# Patient Record
Sex: MALE | Race: WHITE | Employment: OTHER | URBAN - METROPOLITAN AREA
[De-identification: names, ages, dates, MRNs, and addresses within clinical notes are randomized per-mention and may not be internally consistent; named-entity substitution may affect disease eponyms.]

---

## 2022-03-19 PROBLEM — G60.8 MIXED SENSORY-MOTOR POLYNEUROPATHY: Status: ACTIVE | Noted: 2021-05-11

## 2022-06-27 ENCOUNTER — OFFICE VISIT (OUTPATIENT)
Dept: NEUROLOGY | Age: 77
End: 2022-06-27
Payer: MEDICARE

## 2022-06-27 VITALS
DIASTOLIC BLOOD PRESSURE: 88 MMHG | BODY MASS INDEX: 24.91 KG/M2 | WEIGHT: 194 LBS | HEART RATE: 63 BPM | SYSTOLIC BLOOD PRESSURE: 149 MMHG

## 2022-06-27 DIAGNOSIS — R20.0 NUMBNESS OF FEET: Primary | ICD-10-CM

## 2022-06-27 DIAGNOSIS — G60.8 MIXED SENSORY-MOTOR POLYNEUROPATHY: ICD-10-CM

## 2022-06-27 PROCEDURE — G8428 CUR MEDS NOT DOCUMENT: HCPCS | Performed by: PSYCHIATRY & NEUROLOGY

## 2022-06-27 PROCEDURE — 1123F ACP DISCUSS/DSCN MKR DOCD: CPT | Performed by: PSYCHIATRY & NEUROLOGY

## 2022-06-27 PROCEDURE — 99215 OFFICE O/P EST HI 40 MIN: CPT | Performed by: PSYCHIATRY & NEUROLOGY

## 2022-06-27 PROCEDURE — 4004F PT TOBACCO SCREEN RCVD TLK: CPT | Performed by: PSYCHIATRY & NEUROLOGY

## 2022-06-27 PROCEDURE — G8420 CALC BMI NORM PARAMETERS: HCPCS | Performed by: PSYCHIATRY & NEUROLOGY

## 2022-06-27 PROCEDURE — 95910 NRV CNDJ TEST 7-8 STUDIES: CPT | Performed by: PSYCHIATRY & NEUROLOGY

## 2022-06-27 ASSESSMENT — VISUAL ACUITY: VA_NORMAL: 1

## 2022-06-27 NOTE — PROGRESS NOTES
6/27/2022  [place] 65 y.o. male      Chief Complaint:  No chief complaint on file. Followup Note:   No falling, numbness in feet, noticed not as good in balance, hiking and fishing some areas not comfortable to go through. No meds needed. Intense itching sensation in feet, relieved by bending stretching by fingers touch to toes. No LBP. No alcohol abuse, no diabetes. Previous clear cut history of agent orange exposure. No family history of neuropathy. Review Test Results: I have reviewed imaging study and lab tests, discussed results with patient in detail. Current Outpatient Medications   Medication Sig Dispense Refill    NIACIN PO Take by mouth      allopurinol (ZYLOPRIM) 300 MG tablet Take by mouth daily      aspirin 81 MG EC tablet Take by mouth daily      cyanocobalamin 500 MCG tablet Take 500 mcg by mouth daily      dicyclomine (BENTYL) 10 MG capsule Take 10 mg by mouth 4 times daily (before meals and nightly)      famotidine (PEPCID) 20 MG tablet Take 20 mg by mouth 2 times daily      folic acid (FOLVITE) 1 MG tablet Take by mouth daily      propranolol (INDERAL) 80 MG tablet Take 80 mg by mouth daily      pyridoxine (B-6) 100 MG tablet Take 100 mg by mouth Twice a Week      Red Yeast Rice Extract 600 MG CAPS Take 600 mg by mouth       No current facility-administered medications for this visit. Review of Systems:  Review of Systems   Musculoskeletal: Negative for falls. Neurological: Positive for tingling. Negative for weakness. Examination:  Vitals:    06/27/22 1128   BP: (!) 149/88   Site: Left Upper Arm   Pulse: 63   Weight: 194 lb (88 kg)        Physical Exam  Constitutional:       Appearance: He is normal weight. HENT:      Head: Normocephalic. Eyes:      Extraocular Movements: Extraocular movements intact and EOM normal.      Conjunctiva/sclera: Conjunctivae normal.      Pupils: Pupils are equal, round, and reactive to light.    Cardiovascular: Rate and Rhythm: Normal rate. Pulmonary:      Effort: Pulmonary effort is normal.   Musculoskeletal:         General: Normal range of motion. Cervical back: Normal range of motion. Skin:     General: Skin is warm and dry. Neurological:      Mental Status: He is alert and oriented to person, place, and time. Cranial Nerves: No cranial nerve deficit. Sensory: Sensory deficit present. Motor: No weakness. Coordination: Coordination normal. Romberg Test normal.      Gait: Gait is intact. Gait normal.      Deep Tendon Reflexes: Strength normal. Reflexes abnormal.      Reflex Scores:       Tricep reflexes are 2+ on the right side and 2+ on the left side. Bicep reflexes are 2+ on the right side and 2+ on the left side. Brachioradialis reflexes are 2+ on the right side and 2+ on the left side. Patellar reflexes are 2+ on the right side and 1+ on the left side. Achilles reflexes are 0 on the right side and 0 on the left side. Psychiatric:         Mood and Affect: Mood normal.         Speech: Speech normal.         Behavior: Behavior normal.         Thought Content: Thought content normal.         Judgment: Judgment normal.          Neurologic Exam     Mental Status   Oriented to person, place, and time. Concentration: normal.   Speech: speech is normal   Level of consciousness: alert  Knowledge: good. Normal comprehension. Cranial Nerves     CN II   Visual fields full to confrontation. Visual acuity: normal    CN III, IV, VI   Pupils are equal, round, and reactive to light. Extraocular motions are normal.   Right pupil: Size: 3 mm. Shape: regular. Left pupil: Size: 3 mm. Shape: regular. Nystagmus: none     CN VIII   CN VIII normal.     Motor Exam   Muscle bulk: increased  Right arm pronator drift: absent  Left arm pronator drift: absent    Strength   Strength 5/5 throughout. Atrophy in AH bilaterally. No hammer toes.  Left toe flexors slightly weak, EHL good both sides. Sensory Exam   Light touch normal.   Right arm vibration: normal  Left arm vibration: normal  Right leg vibration: decreased from ankle  Left leg vibration: decreased from ankle  Right arm pinprick: normal  Left arm pinprick: normal  Right leg pinprick: decreased from ankle  Left leg pinprick: decreased from ankle    Gait, Coordination, and Reflexes     Gait  Gait: normal    Coordination   Romberg: negative    Tremor   Resting tremor: absent  Intention tremor: absent  Action tremor: absent    Reflexes   Right brachioradialis: 2+  Left brachioradialis: 2+  Right biceps: 2+  Left biceps: 2+  Right triceps: 2+  Left triceps: 2+  Right patellar: 2+  Left patellar: 1+  Right achilles: 0  Left achilles: 0  Right plantar: normal  Left plantar: normal        Assessment / Plan:    Diagnoses and all orders for this visit:    Numbness of feet    Mixed sensory-motor polyneuropathy    Today's nerve conduction study showed worsening responses in bilateral tibial nerve. However, neurologic examination showed mild weakness of distal groups in toe flexors, significant and worse bilateral abductor hallucis atrophy. Given the pattern of abnormal nerve conduction study findings with progressively prolonged distal latencies and F-wave latencies, reduced conduction velocities, we will check anti mag antibodies. We have reviewed daily activities and neuropathy care. He developed some imbalance but no fall. He was advised to maintain on the current healthy lifestyle with fall precautions. Previous neuropathy lab screening was in normal range. No symptomatic treatment is needed at this time. He was advised to avoid crossing legs. I have spent 40 min, greater than 50% of discussing and counseling with patient, for treatment and diagnostic plan review.

## 2022-06-27 NOTE — PROGRESS NOTES
[address], 236 [facility], [address] [place] [address]       Nerve Conduction Study and Electromyogram Report           Hx: 65 y.o. RH male returning for repeat EMG/NCV of the lower extremities, Patient reports 25+ year hx of neuropathy. Persistent numbness of feet. No hx of DM        Clinical summary was reviewed. Neurological examination: Motor power showed normal. There was distal atrophy. There were no fasciculations. Deep tendon reflexes were hypoactive. Sensory deficit at ankle level, light touch was present. Gait was stable. Description: Nerve conduction studies were performed on the right lower extremity and left lower extremity, using standard technique. Bilateral sural nerve showed no response, unchanged. Right peroneal motor distal latency borderline, amplitude normal 3.3 mV, CV mildly reduced 37/38, unchanged; left distal latency prolonged 6.27 MS, amplitude reduced 1.6 mV, CV reduced at 30/28 (worse). Bilateral tibial motor distal latency prolonged 6.38 MS right and 6.04 MS left, amplitudes significantly reduced 0.3 and 0.8 mV, CV reduced 30 and 31 respectively, worse compared with previous studies. F-wave latencies were absent on bilateral tibial, worse; prolonged peroneal 66.1 and 68.1 MS. H reflex response was present on the left but delayed 41.20 MS, poorly formed on the right 39.17 MS. Needle electromyography was not performed due to absent weakness or radiculopathic sign. Conclusion: This study showed neurophysiologic evidence of several abnormalities 1) progressive distal sensorimotor polyneuropathy, worse compared with the previous studies a year ago and 2 years ago. 2) left-sided peroneal compression at the fibular head.           Procedure Details: Under procedure category          Marlin Beckett MD

## 2024-04-24 ENCOUNTER — INITIAL CONSULT (OUTPATIENT)
Age: 79
End: 2024-04-24
Payer: MEDICARE

## 2024-04-24 VITALS
BODY MASS INDEX: 24.51 KG/M2 | DIASTOLIC BLOOD PRESSURE: 82 MMHG | SYSTOLIC BLOOD PRESSURE: 150 MMHG | WEIGHT: 191 LBS | HEIGHT: 74 IN | HEART RATE: 69 BPM

## 2024-04-24 DIAGNOSIS — Z76.89 ENCOUNTER TO ESTABLISH CARE: ICD-10-CM

## 2024-04-24 DIAGNOSIS — I25.10 CORONARY ARTERY DISEASE DUE TO LIPID RICH PLAQUE: ICD-10-CM

## 2024-04-24 DIAGNOSIS — I25.10 CORONARY ARTERY DISEASE DUE TO LIPID RICH PLAQUE: Primary | ICD-10-CM

## 2024-04-24 DIAGNOSIS — I25.83 CORONARY ARTERY DISEASE DUE TO LIPID RICH PLAQUE: ICD-10-CM

## 2024-04-24 DIAGNOSIS — G60.8 MIXED SENSORY-MOTOR POLYNEUROPATHY: ICD-10-CM

## 2024-04-24 DIAGNOSIS — R94.39 ABNORMAL CARDIOVASCULAR STRESS TEST: ICD-10-CM

## 2024-04-24 DIAGNOSIS — I25.119 ATHEROSCLEROSIS OF NATIVE CORONARY ARTERY OF NATIVE HEART WITH ANGINA PECTORIS (HCC): ICD-10-CM

## 2024-04-24 DIAGNOSIS — I25.119 CORONARY ARTERY DISEASE INVOLVING NATIVE CORONARY ARTERY OF NATIVE HEART WITH ANGINA PECTORIS (HCC): ICD-10-CM

## 2024-04-24 DIAGNOSIS — I25.83 CORONARY ARTERY DISEASE DUE TO LIPID RICH PLAQUE: Primary | ICD-10-CM

## 2024-04-24 LAB
ANION GAP SERPL CALC-SCNC: 12 MMOL/L (ref 9–18)
BASOPHILS # BLD: 0.1 K/UL (ref 0–0.2)
BASOPHILS NFR BLD: 1 % (ref 0–2)
BUN SERPL-MCNC: 11 MG/DL (ref 8–23)
CALCIUM SERPL-MCNC: 9.9 MG/DL (ref 8.8–10.2)
CHLORIDE SERPL-SCNC: 102 MMOL/L (ref 98–107)
CO2 SERPL-SCNC: 28 MMOL/L (ref 20–28)
CREAT SERPL-MCNC: 1 MG/DL (ref 0.8–1.3)
DIFFERENTIAL METHOD BLD: ABNORMAL
EOSINOPHIL # BLD: 0.1 K/UL (ref 0–0.8)
EOSINOPHIL NFR BLD: 1 % (ref 0.5–7.8)
ERYTHROCYTE [DISTWIDTH] IN BLOOD BY AUTOMATED COUNT: 12.9 % (ref 11.9–14.6)
GLUCOSE SERPL-MCNC: 97 MG/DL (ref 70–99)
HCT VFR BLD AUTO: 52.1 % (ref 41.1–50.3)
HGB BLD-MCNC: 17.9 G/DL (ref 13.6–17.2)
IMM GRANULOCYTES # BLD AUTO: 0.1 K/UL (ref 0–0.5)
IMM GRANULOCYTES NFR BLD AUTO: 1 % (ref 0–5)
LYMPHOCYTES # BLD: 2.7 K/UL (ref 0.5–4.6)
LYMPHOCYTES NFR BLD: 27 % (ref 13–44)
MCH RBC QN AUTO: 32.6 PG (ref 26.1–32.9)
MCHC RBC AUTO-ENTMCNC: 34.4 G/DL (ref 31.4–35)
MCV RBC AUTO: 94.9 FL (ref 82–102)
MONOCYTES # BLD: 0.9 K/UL (ref 0.1–1.3)
MONOCYTES NFR BLD: 9 % (ref 4–12)
NEUTS SEG # BLD: 6.2 K/UL (ref 1.7–8.2)
NEUTS SEG NFR BLD: 61 % (ref 43–78)
NRBC # BLD: 0 K/UL (ref 0–0.2)
PLATELET # BLD AUTO: 182 K/UL (ref 150–450)
PMV BLD AUTO: 11.1 FL (ref 9.4–12.3)
POTASSIUM SERPL-SCNC: 4.4 MMOL/L (ref 3.5–5.1)
RBC # BLD AUTO: 5.49 M/UL (ref 4.23–5.6)
SODIUM SERPL-SCNC: 142 MMOL/L (ref 136–145)
WBC # BLD AUTO: 10.1 K/UL (ref 4.3–11.1)

## 2024-04-24 PROCEDURE — 99205 OFFICE O/P NEW HI 60 MIN: CPT | Performed by: INTERNAL MEDICINE

## 2024-04-24 PROCEDURE — G8420 CALC BMI NORM PARAMETERS: HCPCS | Performed by: INTERNAL MEDICINE

## 2024-04-24 PROCEDURE — 1036F TOBACCO NON-USER: CPT | Performed by: INTERNAL MEDICINE

## 2024-04-24 PROCEDURE — 1123F ACP DISCUSS/DSCN MKR DOCD: CPT | Performed by: INTERNAL MEDICINE

## 2024-04-24 PROCEDURE — 93000 ELECTROCARDIOGRAM COMPLETE: CPT | Performed by: INTERNAL MEDICINE

## 2024-04-24 PROCEDURE — G8428 CUR MEDS NOT DOCUMENT: HCPCS | Performed by: INTERNAL MEDICINE

## 2024-04-24 RX ORDER — SODIUM CHLORIDE 0.9 % (FLUSH) 0.9 %
5-40 SYRINGE (ML) INJECTION PRN
Status: CANCELLED | OUTPATIENT
Start: 2024-04-24

## 2024-04-24 RX ORDER — ASPIRIN 325 MG
325 TABLET ORAL ONCE
Status: CANCELLED | OUTPATIENT
Start: 2024-04-24 | End: 2024-04-24

## 2024-04-24 RX ORDER — TADALAFIL 5 MG/1
5 TABLET ORAL DAILY
COMMUNITY

## 2024-04-24 RX ORDER — SODIUM CHLORIDE 9 MG/ML
INJECTION, SOLUTION INTRAVENOUS PRN
Status: CANCELLED | OUTPATIENT
Start: 2024-04-24

## 2024-04-24 RX ORDER — SODIUM CHLORIDE 9 MG/ML
INJECTION, SOLUTION INTRAVENOUS CONTINUOUS
Status: CANCELLED | OUTPATIENT
Start: 2024-04-24

## 2024-04-24 RX ORDER — SODIUM CHLORIDE 0.9 % (FLUSH) 0.9 %
5-40 SYRINGE (ML) INJECTION EVERY 12 HOURS SCHEDULED
Status: CANCELLED | OUTPATIENT
Start: 2024-04-24

## 2024-04-24 RX ORDER — LORAZEPAM 0.5 MG/1
0.5 TABLET ORAL
Status: CANCELLED | OUTPATIENT
Start: 2024-04-24 | End: 2024-04-25

## 2024-04-24 NOTE — PROGRESS NOTES
cardiovascular stress test    Mixed sensory-motor polyneuropathy    Other orders  -     Left Heart Cath / Coronary Angiography; Standing  -     Initiate PAT Protocol; Future  -     Full code; Standing  -     Verify informed consent; Standing  -     Verify pre-procedure history and physical completed; Standing  -     Procedure Consent; Standing  -     Diagnosis for Procedure; Standing  -     Vital signs per unit routine; Standing  -     Vital signs (specify frequency); Standing  -     Diet NPO Exceptions are: Sips of Water with Meds; Standing  -     Skin cleansing in pre-procedure area; Standing  -     Clip procedure site; Standing  -     Initiate Oxygen Therapy Protocol; Standing  -     CBC; Standing  -     Basic Metabolic Panel; Standing  -     Electrocardiogram, 12-lead ; Standing  -     0.9 % sodium chloride infusion  -     sodium chloride flush 0.9 % injection 5-40 mL  -     sodium chloride flush 0.9 % injection 5-40 mL  -     0.9 % sodium chloride infusion  -     aspirin tablet 325 mg  -     LORazepam (ATIVAN) tablet 0.5 mg  -     Left Heart Cath / Coronary Angiography          PLAN:     Abnormal NST at [facility] as above, more angina.  We will move forward with [facility] as reviewed.     Plan for Left Heart Catheterization and possible Percutaneous Coronary Intervention (PCI) at [facility] due to Worsening angina within the last 2 mos as described in HPI.  Discussed risk of cardiac catheterization and potential PCI with the patient in detail.   These risks include, but are not limited to, bleeding, stroke, heart attack, cardiac arrhythmias, allergic reactions, atheroemboli, acute kidney injury and cardiac arrest/death.  Local complications at the site of catheter insertion were also reviewed and discussed. The patient voiced complete understanding about these risks.  The patient agrees to proceed with the aforementioned associated risks.    Check labs beforehand.      Follow BP, has white coat HTN.       The

## 2024-04-25 NOTE — PROGRESS NOTES
Patient pre-assessment complete for Mao Braden scheduled for [facility], arrival time 1130. Patient verified using .  NPO status reinforced. Patient informed to take a full dose aspirin 325mg  or 81 mg x 4 on the day of procedure.  Instructed they can take all other medications excluding vitamins & supplements. Patient verbalizes understanding of all instructions & denies any questions at this time.

## 2024-04-26 ENCOUNTER — HOSPITAL ENCOUNTER (OUTPATIENT)
Age: 79
Setting detail: OUTPATIENT SURGERY
Discharge: HOME | End: 2024-04-26
Attending: INTERNAL MEDICINE | Admitting: INTERNAL MEDICINE
Payer: MEDICARE

## 2024-04-26 VITALS
SYSTOLIC BLOOD PRESSURE: 119 MMHG | HEIGHT: 74 IN | WEIGHT: 191 LBS | HEART RATE: 59 BPM | OXYGEN SATURATION: 96 % | RESPIRATION RATE: 19 BRPM | DIASTOLIC BLOOD PRESSURE: 64 MMHG | BODY MASS INDEX: 24.51 KG/M2 | TEMPERATURE: 97.9 F

## 2024-04-26 DIAGNOSIS — I25.119 ATHEROSCLEROSIS OF NATIVE CORONARY ARTERY OF NATIVE HEART WITH ANGINA PECTORIS (HCC): ICD-10-CM

## 2024-04-26 LAB
ECHO BSA: 2.13 M2
EKG ATRIAL RATE: 59 BPM
EKG DIAGNOSIS: NORMAL
EKG P AXIS: -10 DEGREES
EKG P-R INTERVAL: 164 MS
EKG Q-T INTERVAL: 412 MS
EKG QRS DURATION: 86 MS
EKG QTC CALCULATION (BAZETT): 407 MS
EKG R AXIS: -51 DEGREES
EKG T AXIS: 47 DEGREES
EKG VENTRICULAR RATE: 59 BPM

## 2024-04-26 PROCEDURE — 93005 ELECTROCARDIOGRAM TRACING: CPT | Performed by: INTERNAL MEDICINE

## 2024-04-26 PROCEDURE — 2580000003 HC RX 258: Performed by: INTERNAL MEDICINE

## 2024-04-26 PROCEDURE — 99152 MOD SED SAME PHYS/QHP 5/>YRS: CPT | Performed by: INTERNAL MEDICINE

## 2024-04-26 PROCEDURE — 6360000004 HC RX CONTRAST MEDICATION: Performed by: INTERNAL MEDICINE

## 2024-04-26 PROCEDURE — C1894 INTRO/SHEATH, NON-LASER: HCPCS | Performed by: INTERNAL MEDICINE

## 2024-04-26 PROCEDURE — 93458 L HRT ARTERY/VENTRICLE ANGIO: CPT | Performed by: INTERNAL MEDICINE

## 2024-04-26 PROCEDURE — 93571 IV DOP VEL&/PRESS C FLO 1ST: CPT | Performed by: INTERNAL MEDICINE

## 2024-04-26 PROCEDURE — C1887 CATHETER, GUIDING: HCPCS | Performed by: INTERNAL MEDICINE

## 2024-04-26 PROCEDURE — 6360000002 HC RX W HCPCS: Performed by: INTERNAL MEDICINE

## 2024-04-26 PROCEDURE — 93010 ELECTROCARDIOGRAM REPORT: CPT | Performed by: INTERNAL MEDICINE

## 2024-04-26 PROCEDURE — C1769 GUIDE WIRE: HCPCS | Performed by: INTERNAL MEDICINE

## 2024-04-26 PROCEDURE — 2500000003 HC RX 250 WO HCPCS: Performed by: INTERNAL MEDICINE

## 2024-04-26 PROCEDURE — 2709999900 HC NON-CHARGEABLE SUPPLY: Performed by: INTERNAL MEDICINE

## 2024-04-26 RX ORDER — NITROGLYCERIN 20 MG/100ML
INJECTION INTRAVENOUS PRN
Status: DISCONTINUED | OUTPATIENT
Start: 2024-04-26 | End: 2024-04-26 | Stop reason: HOSPADM

## 2024-04-26 RX ORDER — MIDAZOLAM HYDROCHLORIDE 1 MG/ML
INJECTION INTRAMUSCULAR; INTRAVENOUS PRN
Status: DISCONTINUED | OUTPATIENT
Start: 2024-04-26 | End: 2024-04-26 | Stop reason: HOSPADM

## 2024-04-26 RX ORDER — HEPARIN SODIUM 200 [USP'U]/100ML
INJECTION, SOLUTION INTRAVENOUS CONTINUOUS PRN
Status: DISCONTINUED | OUTPATIENT
Start: 2024-04-26 | End: 2024-04-26 | Stop reason: HOSPADM

## 2024-04-26 RX ORDER — LIDOCAINE HYDROCHLORIDE 10 MG/ML
INJECTION, SOLUTION INFILTRATION; PERINEURAL PRN
Status: DISCONTINUED | OUTPATIENT
Start: 2024-04-26 | End: 2024-04-26 | Stop reason: HOSPADM

## 2024-04-26 RX ORDER — ASPIRIN 325 MG
325 TABLET ORAL ONCE
Status: DISCONTINUED | OUTPATIENT
Start: 2024-04-26 | End: 2024-04-26 | Stop reason: HOSPADM

## 2024-04-26 RX ORDER — LORAZEPAM 0.5 MG/1
0.5 TABLET ORAL
Status: DISCONTINUED | OUTPATIENT
Start: 2024-04-26 | End: 2024-04-26

## 2024-04-26 RX ORDER — BIVALIRUDIN 250 MG/5ML
INJECTION, POWDER, LYOPHILIZED, FOR SOLUTION INTRAVENOUS PRN
Status: DISCONTINUED | OUTPATIENT
Start: 2024-04-26 | End: 2024-04-26 | Stop reason: HOSPADM

## 2024-04-26 RX ORDER — SODIUM CHLORIDE 0.9 % (FLUSH) 0.9 %
5-40 SYRINGE (ML) INJECTION PRN
Status: DISCONTINUED | OUTPATIENT
Start: 2024-04-26 | End: 2024-04-26

## 2024-04-26 RX ORDER — SODIUM CHLORIDE 9 MG/ML
INJECTION, SOLUTION INTRAVENOUS CONTINUOUS
Status: DISCONTINUED | OUTPATIENT
Start: 2024-04-26 | End: 2024-04-26 | Stop reason: HOSPADM

## 2024-04-26 RX ORDER — SODIUM CHLORIDE 0.9 % (FLUSH) 0.9 %
5-40 SYRINGE (ML) INJECTION EVERY 12 HOURS SCHEDULED
Status: DISCONTINUED | OUTPATIENT
Start: 2024-04-26 | End: 2024-04-26

## 2024-04-26 RX ORDER — SODIUM CHLORIDE 9 MG/ML
INJECTION, SOLUTION INTRAVENOUS PRN
Status: DISCONTINUED | OUTPATIENT
Start: 2024-04-26 | End: 2024-04-26 | Stop reason: HOSPADM

## 2024-04-26 NOTE — PROGRESS NOTES
1655-patient ambulated to bath room with no difficulties. Right radial with no bleeding or hematoma.  Rband removed and sterile dressing applied to site.    1700- all discharge instructions explained to patient and family. Time allowed for questions no. No questions and concerns at this time.    1705- right radial checked. Site with no redness or bleeding. pt discharged to home via Wheelchair with family.

## 2024-04-26 NOTE — DISCHARGE INSTRUCTIONS

## 2024-04-26 NOTE — PROGRESS NOTES
TRANSFER - IN REPORT:    Verbal report received from Misael BLANCO on Mao Braden  being received from [facility] for routine progression of patient care      Report consisted of patient's Situation, Background, Assessment and   Recommendations(SBAR).     Information from the following report(s) Nurse Handoff Report was reviewed with the receiving nurse.    Opportunity for questions and clarification was provided.      Assessment completed upon patient's arrival to unit and care assumed.

## 2024-04-26 NOTE — PROGRESS NOTES
Patient received to CPRU room # 14  Ambulatory from [facility]. Patient scheduled for LHC today with Dr Del Rio. Procedure reviewed & questions answered, voiced good understanding consent obtained & placed on chart. All medications and medical history reviewed. Will prep patient per orders. Patient & family updated on plan of care.      The patient has a fraility score of 3-MANAGING WELL, based on ambulation.     Patient took Aspirin 324 today at 0900 prior to arrival.

## 2024-04-26 NOTE — PROGRESS NOTES
TRANSFER - OUT REPORT:    Verbal report given to RN on Mao Braden  being transferred to CPRU for routine progression of patient care       Report consisted of patient's Situation, Background, Assessment and   Recommendations(SBAR).     Information from the following report(s) Nurse Handoff Report and MAR was reviewed with the receiving nurse.      [facility] w/ Dr. Del Rio  Pressure wire to RCA  R radial access  TR band to R radial @ 12mL  No s/sxs of bleeding or hematoma to R radial access site    Heparin 5,000 units IC  Versed 2mg IV  Angiomax bolus and gtt, gtt ran from 1403 until 1410

## 2024-05-16 ENCOUNTER — OFFICE VISIT (OUTPATIENT)
Age: 79
End: 2024-05-16
Payer: MEDICARE

## 2024-05-16 VITALS
DIASTOLIC BLOOD PRESSURE: 86 MMHG | BODY MASS INDEX: 24.64 KG/M2 | HEART RATE: 64 BPM | WEIGHT: 192 LBS | HEIGHT: 74 IN | SYSTOLIC BLOOD PRESSURE: 138 MMHG

## 2024-05-16 DIAGNOSIS — R06.02 SHORTNESS OF BREATH: ICD-10-CM

## 2024-05-16 DIAGNOSIS — E78.00 PURE HYPERCHOLESTEROLEMIA: ICD-10-CM

## 2024-05-16 DIAGNOSIS — I25.10 CORONARY ARTERY DISEASE INVOLVING NATIVE CORONARY ARTERY OF NATIVE HEART WITHOUT ANGINA PECTORIS: Primary | ICD-10-CM

## 2024-05-16 DIAGNOSIS — Z09 HOSPITAL DISCHARGE FOLLOW-UP: ICD-10-CM

## 2024-05-16 PROBLEM — I25.119 ATHEROSCLEROSIS OF NATIVE CORONARY ARTERY OF NATIVE HEART WITH ANGINA PECTORIS (HCC): Status: RESOLVED | Noted: 2024-04-24 | Resolved: 2024-05-16

## 2024-05-16 PROBLEM — R94.39 ABNORMAL CARDIOVASCULAR STRESS TEST: Status: RESOLVED | Noted: 2024-04-24 | Resolved: 2024-05-16

## 2024-05-16 PROCEDURE — 1111F DSCHRG MED/CURRENT MED MERGE: CPT | Performed by: INTERNAL MEDICINE

## 2024-05-16 PROCEDURE — 1123F ACP DISCUSS/DSCN MKR DOCD: CPT | Performed by: INTERNAL MEDICINE

## 2024-05-16 PROCEDURE — G8428 CUR MEDS NOT DOCUMENT: HCPCS | Performed by: INTERNAL MEDICINE

## 2024-05-16 PROCEDURE — G8420 CALC BMI NORM PARAMETERS: HCPCS | Performed by: INTERNAL MEDICINE

## 2024-05-16 PROCEDURE — 1036F TOBACCO NON-USER: CPT | Performed by: INTERNAL MEDICINE

## 2024-05-16 PROCEDURE — 99214 OFFICE O/P EST MOD 30 MIN: CPT | Performed by: INTERNAL MEDICINE

## 2024-10-16 ENCOUNTER — TELEPHONE (OUTPATIENT)
Age: 79
End: 2024-10-16

## 2024-10-16 NOTE — TELEPHONE ENCOUNTER
Cardiac Clearance        Physician or Practice Requesting:[facility]  : Dr Gold Sweeney  Contact Phone Number: [phone]  Fax Number: [phone]  Date of Surgery/Procedure: 10/28/24  Type of Surgery or Procedure: Prostate biopsy  Type of Anesthesia: General  Type of Clearance Requested: Cardiac Clearance and Medication Hold  Medication to Hold:?  Days to Hold: ?

## 2024-12-03 ENCOUNTER — OFFICE VISIT (OUTPATIENT)
Age: 79
End: 2024-12-03
Payer: MEDICARE

## 2024-12-03 VITALS
HEIGHT: 74 IN | BODY MASS INDEX: 25.28 KG/M2 | HEART RATE: 60 BPM | SYSTOLIC BLOOD PRESSURE: 131 MMHG | DIASTOLIC BLOOD PRESSURE: 76 MMHG | WEIGHT: 197 LBS

## 2024-12-03 DIAGNOSIS — E78.00 PURE HYPERCHOLESTEROLEMIA: ICD-10-CM

## 2024-12-03 DIAGNOSIS — I25.10 CORONARY ARTERY DISEASE INVOLVING NATIVE CORONARY ARTERY OF NATIVE HEART WITHOUT ANGINA PECTORIS: Primary | ICD-10-CM

## 2024-12-03 PROCEDURE — 99214 OFFICE O/P EST MOD 30 MIN: CPT | Performed by: INTERNAL MEDICINE

## 2024-12-03 PROCEDURE — 1126F AMNT PAIN NOTED NONE PRSNT: CPT | Performed by: INTERNAL MEDICINE

## 2024-12-03 PROCEDURE — 1159F MED LIST DOCD IN RCRD: CPT | Performed by: INTERNAL MEDICINE

## 2024-12-03 PROCEDURE — G8419 CALC BMI OUT NRM PARAM NOF/U: HCPCS | Performed by: INTERNAL MEDICINE

## 2024-12-03 PROCEDURE — 1123F ACP DISCUSS/DSCN MKR DOCD: CPT | Performed by: INTERNAL MEDICINE

## 2024-12-03 PROCEDURE — 1036F TOBACCO NON-USER: CPT | Performed by: INTERNAL MEDICINE

## 2024-12-03 PROCEDURE — G8427 DOCREV CUR MEDS BY ELIG CLIN: HCPCS | Performed by: INTERNAL MEDICINE

## 2024-12-03 PROCEDURE — G8484 FLU IMMUNIZE NO ADMIN: HCPCS | Performed by: INTERNAL MEDICINE

## 2024-12-03 NOTE — PROGRESS NOTES
future.  Consider injectibles as needed.   1/2024 [facility] labs:  LDL 74, trig 336. More labs in Feb.      Has white coat HTN, home SBP 130s.   Labs in Feb.      Patient has been instructed and agrees to call our office with any issues or other concerns related to their cardiac condition(s) and/or complaint(s).        Return in about 1 year (around 12/3/2025).       Juan Burden, DO  12/3/2024

## (undated) DEVICE — VASC-BAND REG

## (undated) DEVICE — GUIDE 6FR JR4 CORDIS 100CM

## (undated) DEVICE — CATHETER COR DIAG 4.0 5FR 110CM 2 SIDE H

## (undated) DEVICE — WIRE .035 3MM J TIP 260CM

## (undated) DEVICE — GLIDESHEATH SLENDER STAINLESS STEEL KIT: Brand: GLIDESHEATH SLENDER

## (undated) DEVICE — CATHETER 5FR PIG 145 MEDTRONIC 110CM

## (undated) DEVICE — PRESSURE GUIDEWIRE: Brand: COMET™ II

## (undated) DEVICE — COPILOT BLEEDBACK CONTROL VALVE: Brand: COPILOT